# Patient Record
Sex: FEMALE | Race: WHITE | Employment: OTHER | URBAN - METROPOLITAN AREA
[De-identification: names, ages, dates, MRNs, and addresses within clinical notes are randomized per-mention and may not be internally consistent; named-entity substitution may affect disease eponyms.]

---

## 2022-11-20 ENCOUNTER — HOSPITAL ENCOUNTER (EMERGENCY)
Dept: GENERAL RADIOLOGY | Age: 67
Discharge: HOME | End: 2022-11-23
Payer: MEDICAID

## 2022-11-20 ENCOUNTER — HOSPITAL ENCOUNTER (EMERGENCY)
Dept: CT IMAGING | Age: 67
Discharge: HOME | End: 2022-11-23
Payer: MEDICAID

## 2022-11-20 ENCOUNTER — APPOINTMENT (OUTPATIENT)
Dept: CT IMAGING | Age: 67
End: 2022-11-20
Payer: MEDICAID

## 2022-11-20 ENCOUNTER — HOSPITAL ENCOUNTER (EMERGENCY)
Age: 67
Discharge: HOME | End: 2022-11-20
Attending: EMERGENCY MEDICINE | Admitting: EMERGENCY MEDICINE
Payer: MEDICAID

## 2022-11-20 VITALS
TEMPERATURE: 97.6 F | WEIGHT: 255 LBS | HEIGHT: 62 IN | HEART RATE: 78 BPM | SYSTOLIC BLOOD PRESSURE: 165 MMHG | DIASTOLIC BLOOD PRESSURE: 82 MMHG | OXYGEN SATURATION: 98 % | RESPIRATION RATE: 20 BRPM | BODY MASS INDEX: 46.93 KG/M2

## 2022-11-20 DIAGNOSIS — S09.90XA CLOSED HEAD INJURY, INITIAL ENCOUNTER: ICD-10-CM
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
## 2022-11-20 DIAGNOSIS — T14.8XXA MUSCLE STRAIN: ICD-10-CM

## 2022-11-20 DIAGNOSIS — M25.532 WRIST PAIN, ACUTE, LEFT: ICD-10-CM

## 2022-11-20 PROCEDURE — 73080 X-RAY EXAM OF ELBOW: CPT

## 2022-11-20 PROCEDURE — 6370000000 HC RX 637 (ALT 250 FOR IP)

## 2022-11-20 PROCEDURE — 70450 CT HEAD/BRAIN W/O DYE: CPT

## 2022-11-20 PROCEDURE — 72125 CT NECK SPINE W/O DYE: CPT

## 2022-11-20 PROCEDURE — 73030 X-RAY EXAM OF SHOULDER: CPT

## 2022-11-20 PROCEDURE — 73110 X-RAY EXAM OF WRIST: CPT

## 2022-11-20 PROCEDURE — 99284 EMERGENCY DEPT VISIT MOD MDM: CPT

## 2022-11-20 RX ORDER — CYCLOBENZAPRINE HCL 10 MG
10 TABLET ORAL 3 TIMES DAILY PRN
Qty: 21 TABLET | Refills: 0 | Status: SHIPPED | OUTPATIENT
Start: 2022-11-20 | End: 2022-11-20 | Stop reason: SDUPTHER

## 2022-11-20 RX ORDER — ACETAMINOPHEN 500 MG
1000 TABLET ORAL
Status: COMPLETED | OUTPATIENT
Start: 2022-11-20 | End: 2022-11-20

## 2022-11-20 RX ORDER — METHOCARBAMOL 500 MG/1
500 TABLET, FILM COATED ORAL 3 TIMES DAILY
Qty: 21 TABLET | Refills: 0 | Status: SHIPPED | OUTPATIENT
Start: 2022-11-20 | End: 2022-11-27

## 2022-11-20 RX ORDER — CYCLOBENZAPRINE HCL 10 MG
10 TABLET ORAL 3 TIMES DAILY PRN
Qty: 21 TABLET | Refills: 0 | Status: SHIPPED | OUTPATIENT
Start: 2022-11-20 | End: 2022-11-27

## 2022-11-20 RX ADMIN — ACETAMINOPHEN 1000 MG: 500 TABLET, FILM COATED ORAL at 20:35

## 2022-11-20 ASSESSMENT — PAIN DESCRIPTION - LOCATION: LOCATION: SHOULDER

## 2022-11-20 ASSESSMENT — PAIN - FUNCTIONAL ASSESSMENT: PAIN_FUNCTIONAL_ASSESSMENT: 0-10

## 2022-11-20 ASSESSMENT — PAIN SCALES - GENERAL: PAINLEVEL_OUTOF10: 8

## 2022-11-21 NOTE — ED TRIAGE NOTES
Tripped fell on payment , denies LOC   Small closed lac to left forehead, reports left shoulder pain

## 2022-11-21 NOTE — ED PROVIDER NOTES
[facility] Emergency Department Provider Note                   PCP:                Michael Anton MD               Age: 74 y.o. Sex: female       ICD-10-CM    1. Accidental fall, initial encounter  W19. XXXA       2. Closed head injury, initial encounter  S09.90XA       3. Wrist pain, acute, left  M25.532       4. Muscle strain  T14. 8XXA           DISPOSITION           Orders Placed This Encounter   Procedures    XR WRIST LEFT (MIN 3 VIEWS)    XR ELBOW LEFT (MIN 3 VIEWS)    XR SHOULDER LEFT (MIN 2 VIEWS)    CT HEAD WO CONTRAST    CT CERVICAL SPINE WO CONTRAST        Lindle Simmonds is a 74 y.o. female who presents to the Emergency Department with chief complaint of    Chief Complaint   Patient presents with    Fall      72-year-old female presenting the emergency department chief complaint of ground-level mechanical fall that occurred yesterday evening. States she was walking through the parking lot after dinner at [facility]. States it was dark and she tripped on a curb that was dividing to parking lots. Sustained trauma to her forehead. She is unsure whether she lost consciousness or not, states \"next thing I knew when I woke up I was on the ground. \"  Complaining of headache. Additionally complaining of left wrist, elbow, and shoulder pain after the fall. The history is provided by the patient. Review of Systems   Musculoskeletal:  Positive for arthralgias. All other systems reviewed and are negative. No past medical history on file. No past surgical history on file. No family history on file. Social History     Socioeconomic History    Marital status: Legally          Patient has no known allergies. Previous Medications    No medications on file        Vitals signs and nursing note reviewed. Patient Vitals for the past 4 hrs:   Temp Pulse Resp BP SpO2   11/20/22 1905 97.6 °F (36.4 °C) 78 20 (!) 165/82 98 %          Physical Exam  Vitals reviewed. Constitutional:       General: She is not in acute distress. Appearance: Normal appearance. She is normal weight. She is not ill-appearing. HENT:      Head: Normocephalic and atraumatic. Right Ear: Tympanic membrane, ear canal and external ear normal.      Left Ear: Tympanic membrane, ear canal and external ear normal.      Nose: Nose normal. No congestion or rhinorrhea. Mouth/Throat:      Mouth: Mucous membranes are moist.      Pharynx: Oropharynx is clear. No oropharyngeal exudate or posterior oropharyngeal erythema. Eyes:      Extraocular Movements: Extraocular movements intact. Conjunctiva/sclera: Conjunctivae normal.      Pupils: Pupils are equal, round, and reactive to light. Cardiovascular:      Rate and Rhythm: Normal rate and regular rhythm. Pulses: Normal pulses. Heart sounds: Normal heart sounds. No murmur heard. Pulmonary:      Effort: Pulmonary effort is normal. No respiratory distress. Breath sounds: Normal breath sounds. No stridor. No wheezing or rhonchi. Abdominal:      General: Abdomen is flat. Bowel sounds are normal. There is no distension. Palpations: Abdomen is soft. Tenderness: There is no abdominal tenderness. There is no guarding or rebound. Musculoskeletal:         General: Tenderness present. No swelling, deformity or signs of injury. Normal range of motion. Cervical back: Normal range of motion and neck supple. No rigidity or tenderness. Comments: Examination of the left wrist demonstrates tenderness to palpation at the anatomical snuffbox. Examination of the left elbow exhibits tenderness at the lateral epicondyle. Range of motion of fingers, wrist, elbow, and glenohumeral joint intact with no deficits appreciated on the left side. Skin:     General: Skin is warm and dry. Coloration: Skin is not pale. Findings: No erythema, lesion or rash. Neurological:      General: No focal deficit present.       Mental Status: She is alert and oriented to person, place, and time. Mental status is at baseline. Cranial Nerves: No cranial nerve deficit. Motor: No weakness. Comments: Eye tracking test and finger-to-nose tests normal.   Psychiatric:         Mood and Affect: Mood normal.         Behavior: Behavior normal.         Thought Content: Thought content normal.         Judgment: Judgment normal.        MDM  Number of Diagnoses or Management Options  Accidental fall, initial encounter: new, needed workup  Closed head injury, initial encounter: new, needed workup  Muscle strain: new, needed workup  Wrist pain, acute, left: new, needed workup  Diagnosis management comments: 73-year-old female presenting the emergency department chief complaint of accidental fall. Closed head injury, fracture, musculoskeletal strain    Patient states whenever she was walking through the parking lot coming out of a restaurant last night that she stumbled over a curb that she did not see in the dark that was dividing the 2 parking lots. States that she fell and struck her forehead and left side of her body on the pavement. She is unsure if she lost consciousness. Denies any neck pain, vision changes at this time but is complaining of headache as highlighted in the HPI. Obtain CT imaging of head and neck to rule out intracranial fracture/bleed or cervical spine fracture. Obtain x-ray imaging of left wrist, elbow, and shoulder to rule out acute fracture or dislocation. X-ray and CT imaging negative for fracture or intracranial abnormality. Prescribed Robaxin for musculoskeletal pain/spasm. Advised to take Tylenol or ibuprofen as needed for pain relief. Advised to return to the emergency department if symptoms worsen.        Amount and/or Complexity of Data Reviewed  Tests in the radiology section of CPT®: ordered and reviewed        Procedures      Labs Reviewed - No data to display     CT CERVICAL SPINE WO CONTRAST Final Result   1. No evidence of acute cervical spine fracture. 2. Cervical spondylosis. XR WRIST LEFT (MIN 3 VIEWS)   Final Result   Unremarkable exam.         XR ELBOW LEFT (MIN 3 VIEWS)   Final Result   Unremarkable exam.         XR SHOULDER LEFT (MIN 2 VIEWS)   Final Result   Unremarkable exam.         CT HEAD WO CONTRAST   Final Result   Unremarkable unenhanced CT scan of the brain. Voice dictation software was used during the making of this note. This software is not perfect and grammatical and other typographical errors may be present. This note has not been completely proofread for errors.      AISHA Frias  11/20/22 2049       AISHA Frias  11/20/22 2104

## 2022-11-21 NOTE — ED NOTES
I have reviewed discharge instructions with the patient. The patient verbalized understanding. Patient left ED via Discharge Method: ambulatory to Home with self. Opportunity for questions and clarification provided. Patient given 1 scripts. To continue your aftercare when you leave the hospital, you may receive an automated call from our care team to check in on how you are doing. This is a free service and part of our promise to provide the best care and service to meet your aftercare needs.  If you have questions, or wish to unsubscribe from this service please call [phone]. Thank you for Choosing our [address] [place] [address] Emergency Department.       Colleen Barakat RN  11/20/22 1108

## 2022-11-21 NOTE — DISCHARGE INSTRUCTIONS
Your x-ray and CT imaging indicate there are no broken bones present. Take Tylenol or ibuprofen as needed for pain relief. I prescribed you Robaxin for muscle spasm. If your symptoms worsen return to the emergency department. We would love to help you get a primary care doctor for follow-up after your emergency department visit. Please call [phone] between 7AM - 6PM Monday to Friday. A care navigator will be able to assist you with setting up a doctor close to your home.